# Patient Record
Sex: MALE | Race: WHITE
[De-identification: names, ages, dates, MRNs, and addresses within clinical notes are randomized per-mention and may not be internally consistent; named-entity substitution may affect disease eponyms.]

---

## 2020-06-21 ENCOUNTER — HOSPITAL ENCOUNTER (EMERGENCY)
Dept: HOSPITAL 26 - MED | Age: 50
Discharge: LEFT BEFORE DISCHARGE | End: 2020-06-21
Payer: COMMERCIAL

## 2020-06-21 VITALS
WEIGHT: 180 LBS | DIASTOLIC BLOOD PRESSURE: 64 MMHG | BODY MASS INDEX: 28.25 KG/M2 | SYSTOLIC BLOOD PRESSURE: 110 MMHG | HEIGHT: 67 IN

## 2020-06-21 VITALS — DIASTOLIC BLOOD PRESSURE: 71 MMHG | SYSTOLIC BLOOD PRESSURE: 112 MMHG

## 2020-06-21 DIAGNOSIS — K74.69: ICD-10-CM
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
## 2020-06-21 DIAGNOSIS — F10.129: Primary | ICD-10-CM

## 2020-06-21 LAB
ALBUMIN FLD-MCNC: 3.8 G/DL (ref 3.4–5)
ANION GAP SERPL CALCULATED.3IONS-SCNC: 16.1 MMOL/L (ref 8–16)
APAP SERPL-MCNC: < 0.5 UG/ML (ref 10–30)
AST SERPL-CCNC: 140 U/L (ref 15–37)
BASOPHILS # BLD AUTO: 0.1 K/UL (ref 0–0.22)
BASOPHILS NFR BLD AUTO: 1.4 % (ref 0–2)
BILIRUB SERPL-MCNC: 2.1 MG/DL (ref 0–1)
BUN SERPL-MCNC: 9 MG/DL (ref 7–18)
CHLORIDE SERPL-SCNC: 102 MMOL/L (ref 98–107)
CO2 SERPL-SCNC: 28.6 MMOL/L (ref 21–32)
CREAT SERPL-MCNC: 0.8 MG/DL (ref 0.6–1.3)
EOSINOPHIL # BLD AUTO: 0.1 K/UL (ref 0–0.4)
EOSINOPHIL NFR BLD AUTO: 0.9 % (ref 0–4)
ERYTHROCYTE [DISTWIDTH] IN BLOOD BY AUTOMATED COUNT: 12.9 % (ref 11.6–13.7)
GFR SERPL CREATININE-BSD FRML MDRD: 133 ML/MIN (ref 90–?)
GLUCOSE SERPL-MCNC: 121 MG/DL (ref 74–106)
HCT VFR BLD AUTO: 47.1 % (ref 36–52)
HGB BLD-MCNC: 16.1 G/DL (ref 12–18)
LYMPHOCYTES # BLD AUTO: 2.2 K/UL (ref 2–11.5)
LYMPHOCYTES NFR BLD AUTO: 28.4 % (ref 20.5–51.1)
MCH RBC QN AUTO: 33 PG (ref 27–31)
MCHC RBC AUTO-ENTMCNC: 34 G/DL (ref 33–37)
MCV RBC AUTO: 96.7 FL (ref 80–94)
MONOCYTES # BLD AUTO: 0.6 K/UL (ref 0.8–1)
MONOCYTES NFR BLD AUTO: 7.8 % (ref 1.7–9.3)
NEUTROPHILS # BLD AUTO: 4.8 K/UL (ref 1.8–7.7)
NEUTROPHILS NFR BLD AUTO: 61.5 % (ref 42.2–75.2)
PLATELET # BLD AUTO: 164 K/UL (ref 140–450)
POTASSIUM SERPL-SCNC: 3.7 MMOL/L (ref 3.5–5.1)
RBC # BLD AUTO: 4.87 MIL/UL (ref 4.2–6.1)
SALICYLATES SERPL-MCNC: < 2.8 MG/DL (ref 2.8–20)
SODIUM SERPL-SCNC: 143 MMOL/L (ref 136–145)
WBC # BLD AUTO: 7.9 K/UL (ref 4.8–10.8)

## 2020-06-21 PROCEDURE — G0482 DRUG TEST DEF 15-21 CLASSES: HCPCS

## 2020-06-21 PROCEDURE — 99284 EMERGENCY DEPT VISIT MOD MDM: CPT

## 2020-06-21 PROCEDURE — 82140 ASSAY OF AMMONIA: CPT

## 2020-06-21 PROCEDURE — 85025 COMPLETE CBC W/AUTO DIFF WBC: CPT

## 2020-06-21 PROCEDURE — 36415 COLL VENOUS BLD VENIPUNCTURE: CPT

## 2020-06-21 PROCEDURE — A9153 MULTI-VITAMIN NOS: HCPCS

## 2020-06-21 PROCEDURE — G0480 DRUG TEST DEF 1-7 CLASSES: HCPCS

## 2020-06-21 PROCEDURE — 80053 COMPREHEN METABOLIC PANEL: CPT

## 2020-06-21 NOTE — NUR
PT SEEN AMBULATING OUT OF THE ER WITH , PT STATING HE NO LONGER WANTS TO STAY.

I ADVICED HIM , HE HAS A BANANA BAG PENDING WHICH WILL MAKE HIM FEEL BETTER. 
REFUSED, BUT ALLOWED ME TO REMOVED IV FROM LEFT HAND. PT WAS THANKFUL FOR THE 
HELP BUT STATED HE HAD TO LEAVE. MD NOTIFIED

## 2020-06-23 ENCOUNTER — HOSPITAL ENCOUNTER (EMERGENCY)
Dept: HOSPITAL 26 - MED | Age: 50
Discharge: LEFT BEFORE DISCHARGE | End: 2020-06-23
Payer: COMMERCIAL

## 2020-06-23 DIAGNOSIS — Z53.21: Primary | ICD-10-CM

## 2020-06-25 ENCOUNTER — HOSPITAL ENCOUNTER (EMERGENCY)
Dept: HOSPITAL 26 - MED | Age: 50
Discharge: LEFT BEFORE DISCHARGE | End: 2020-06-25
Payer: COMMERCIAL

## 2020-06-25 VITALS — HEIGHT: 70 IN | WEIGHT: 170 LBS | BODY MASS INDEX: 24.34 KG/M2

## 2020-06-25 VITALS — DIASTOLIC BLOOD PRESSURE: 82 MMHG | SYSTOLIC BLOOD PRESSURE: 129 MMHG

## 2020-06-25 DIAGNOSIS — F10.96: Primary | ICD-10-CM

## 2020-06-25 DIAGNOSIS — Z53.21: ICD-10-CM

## 2020-06-30 ENCOUNTER — HOSPITAL ENCOUNTER (EMERGENCY)
Dept: HOSPITAL 26 - MED | Age: 50
Discharge: HOME | End: 2020-06-30
Payer: COMMERCIAL

## 2020-06-30 VITALS — SYSTOLIC BLOOD PRESSURE: 129 MMHG | DIASTOLIC BLOOD PRESSURE: 81 MMHG

## 2020-06-30 VITALS — BODY MASS INDEX: 27.32 KG/M2 | HEIGHT: 66 IN | WEIGHT: 170 LBS

## 2020-06-30 VITALS — DIASTOLIC BLOOD PRESSURE: 81 MMHG | SYSTOLIC BLOOD PRESSURE: 129 MMHG

## 2020-06-30 DIAGNOSIS — F10.129: Primary | ICD-10-CM

## 2020-06-30 PROCEDURE — 99283 EMERGENCY DEPT VISIT LOW MDM: CPT

## 2020-06-30 PROCEDURE — 96374 THER/PROPH/DIAG INJ IV PUSH: CPT

## 2020-06-30 NOTE — NUR
-------------------------------------------------------------------------------

            *** Note undone in [facility] - 06/30/20 at 1611 by DANYELLE ***             

-------------------------------------------------------------------------------

PT TAKEN TO [facility]

## 2020-06-30 NOTE — NUR
48 Y/O M BIBA FROM STREETS. PER EMS PT HAS BEEN DRINKING FOR THE PAST TWO WEEKS 
AND WANTED TO COME TO ER FOR DETOX. LAST DRINK TODAY IN THE AM, VODKA. EN 
ROUTE, PER EMS VSS, 18G PLACED IN RAC, . 



PT PRESENTS A/OX4, COOPERATIVE. PER PT DENIES DYSURIA, OR IRREGULAR BM. PT 
COMPLAINTS OF ABDOMINAL DISCOMFORT BUT NO PAIN. PER PT, NKA. NO HX. NO RX. NO 
NVD.



SIDE RAIL X2. VSS. NO RESPIRATORY DISTRESS.

## 2020-06-30 NOTE — NUR
Patient discharged with v/s stable, A/OX4,AMBULATORY. Written and verbal after 
care instructions given and explained. 

Patient verbalized understanding. Ambulatory with steady gait. All questions 
addressed prior to discharge. Advised to follow up with PMD. 



PT GIVEN HOMELESS RESOURCE PACKET, PROVIDED WITH FOOD, APPROPIATELY DRESS FOR 
WEATHER. PATIENT HOMELESS WAIVER FORM ON CHART.